# Patient Record
Sex: FEMALE | Race: WHITE | NOT HISPANIC OR LATINO | Employment: UNEMPLOYED | URBAN - METROPOLITAN AREA
[De-identification: names, ages, dates, MRNs, and addresses within clinical notes are randomized per-mention and may not be internally consistent; named-entity substitution may affect disease eponyms.]

---

## 2024-02-02 LAB
EXTERNAL ABO GROUPING: NORMAL
EXTERNAL RH FACTOR: POSITIVE
EXTERNAL RUBELLA QUALITATIVE: NORMAL

## 2024-03-05 ENCOUNTER — INITIAL PRENATAL (OUTPATIENT)
Dept: OBSTETRICS AND GYNECOLOGY | Age: 34
End: 2024-03-05
Payer: COMMERCIAL

## 2024-03-05 VITALS — DIASTOLIC BLOOD PRESSURE: 74 MMHG | WEIGHT: 162 LBS | SYSTOLIC BLOOD PRESSURE: 122 MMHG

## 2024-03-05 DIAGNOSIS — D58.2 HIGH BLOOD HEMOGLOBIN F: ICD-10-CM

## 2024-03-05 DIAGNOSIS — Z98.891 HISTORY OF C-SECTION: ICD-10-CM

## 2024-03-05 DIAGNOSIS — Z34.01 ENCOUNTER FOR SUPERVISION OF NORMAL FIRST PREGNANCY IN FIRST TRIMESTER: Primary | ICD-10-CM

## 2024-03-05 DIAGNOSIS — O09.899 HISTORY OF PRETERM DELIVERY, CURRENTLY PREGNANT: ICD-10-CM

## 2024-03-05 RX ORDER — PRENATAL VIT NO.126/IRON/FOLIC 28MG-0.8MG
1 TABLET ORAL DAILY
COMMUNITY

## 2024-03-05 RX ORDER — FAMOTIDINE 20 MG/1
20 TABLET, FILM COATED ORAL
COMMUNITY

## 2024-03-05 RX ORDER — LANOLIN ALCOHOL/MO/W.PET/CERES
50 CREAM (GRAM) TOPICAL
COMMUNITY

## 2024-03-05 NOTE — PROGRESS NOTES
Chief Complaint   Patient presents with    Initial Prenatal Visit     NOb:transfer form [facility],EDC 24, Last ob u/s 24     HPI- Pt is 34 y.o.  at 13w6d here for prenatal visit.     ROS-     - No vaginal bleeding    GI- No abdominal pain    /74   Wt 73.5 kg (162 lb)   Exam - See flow sheet    Fetal heart rate is normal    Assessment-  Diagnoses and all orders for this visit:    Encounter for supervision of normal first pregnancy in first trimester  -     Urine Culture - Urine, Urine, Clean Catch    High blood hemoglobin F    History of     History of  delivery, currently pregnant    Other orders  -     famotidine (PEPCID) 20 MG tablet; Take 1 tablet by mouth.  -     doxylamine (UNISOM) 25 MG tablet; Take 1 tablet by mouth.  -     vitamin B-6 (PYRIDOXINE) 50 MG tablet; Take 1 tablet by mouth.  -     prenatal vitamin (prenatal, CLASSIC, vitamin) tablet; Take 1 tablet by mouth Daily.    Patient is new to our practice,  Previous records reviewed, lab work okay except for minimally elevated hemoglobin F, reflex testing was done and we have sent for those results  Patient declined cell free DNA  Hoping for repeat  at 39 weeks

## 2024-03-07 LAB
BACTERIA UR CULT: NORMAL
BACTERIA UR CULT: NORMAL

## 2024-04-02 ENCOUNTER — ROUTINE PRENATAL (OUTPATIENT)
Dept: OBSTETRICS AND GYNECOLOGY | Age: 34
End: 2024-04-02
Payer: COMMERCIAL

## 2024-04-02 VITALS — DIASTOLIC BLOOD PRESSURE: 70 MMHG | WEIGHT: 162 LBS | SYSTOLIC BLOOD PRESSURE: 120 MMHG

## 2024-04-02 DIAGNOSIS — Z34.82 PRENATAL CARE, SUBSEQUENT PREGNANCY, SECOND TRIMESTER: Primary | ICD-10-CM

## 2024-04-02 PROBLEM — O99.019 MATERNAL ANEMIA IN PREGNANCY, ANTEPARTUM: Status: ACTIVE | Noted: 2024-04-02

## 2024-04-02 LAB
CLARITY, POC: CLEAR
COLOR UR: YELLOW
GLUCOSE UR STRIP-MCNC: NEGATIVE MG/DL
PROT UR STRIP-MCNC: NEGATIVE MG/DL

## 2024-04-02 PROCEDURE — 0502F SUBSEQUENT PRENATAL CARE: CPT | Performed by: OBSTETRICS & GYNECOLOGY

## 2024-04-02 RX ORDER — FERROUS SULFATE 325(65) MG
325 TABLET ORAL
Qty: 30 TABLET | Refills: 11 | Status: SHIPPED | OUTPATIENT
Start: 2024-04-02

## 2024-04-02 NOTE — PROGRESS NOTES
Chief Complaint   Patient presents with    Routine Prenatal Visit     HPI- Pt is 34 y.o.  at 17w6d here for prenatal visit.     ROS-     - No vaginal bleeding    GI- No abdominal pain    /70   Wt 73.5 kg (162 lb)   Exam - See flow sheet    Fetal heart rate is normal    Assessment-  Diagnoses and all orders for this visit:    Prenatal care, subsequent pregnancy, second trimester  -     POC Urinalysis Dipstick    Other orders  -     ferrous sulfate 325 (65 FE) MG tablet; Take 1 tablet by mouth Daily With Breakfast.    Patient started to feel little bit better less nausea  Anatomy scan in 3 to 4 weeks  Discussed her anemia and multivitamin iron supplementation

## 2024-04-23 ENCOUNTER — ROUTINE PRENATAL (OUTPATIENT)
Dept: OBSTETRICS AND GYNECOLOGY | Age: 34
End: 2024-04-23
Payer: COMMERCIAL

## 2024-04-23 VITALS — DIASTOLIC BLOOD PRESSURE: 72 MMHG | WEIGHT: 165 LBS | SYSTOLIC BLOOD PRESSURE: 118 MMHG

## 2024-04-23 DIAGNOSIS — Z34.82 PRENATAL CARE, SUBSEQUENT PREGNANCY, SECOND TRIMESTER: Primary | ICD-10-CM

## 2024-04-23 PROCEDURE — 0502F SUBSEQUENT PRENATAL CARE: CPT | Performed by: OBSTETRICS & GYNECOLOGY

## 2024-04-23 NOTE — PROGRESS NOTES
Chief Complaint   Patient presents with    Pregnancy Ultrasound     Anatomy scan     HPI- Pt is 34 y.o.  at 20w6d here for prenatal visit.     ROS-     - No vaginal bleeding    GI- No abdominal pain    /72   Wt 74.8 kg (165 lb)   Exam - See flow sheet    Fetal heart rate is normal    Assessment-  Diagnoses and all orders for this visit:    Prenatal care, subsequent pregnancy, second trimester  -     POC Urinalysis Dipstick    Reviewed ultrasound findings  Male infant vertex good cervical length we will recheck anatomy scan at 30 weeks for incomplete cardiac otherwise reassuring.  1 hour glucose test in 8 weeks

## 2024-05-22 ENCOUNTER — ROUTINE PRENATAL (OUTPATIENT)
Dept: OBSTETRICS AND GYNECOLOGY | Age: 34
End: 2024-05-22
Payer: COMMERCIAL

## 2024-05-22 VITALS — DIASTOLIC BLOOD PRESSURE: 68 MMHG | WEIGHT: 170 LBS | SYSTOLIC BLOOD PRESSURE: 114 MMHG

## 2024-05-22 DIAGNOSIS — Z34.82 PRENATAL CARE, SUBSEQUENT PREGNANCY, SECOND TRIMESTER: Primary | ICD-10-CM

## 2024-05-22 NOTE — PROGRESS NOTES
Chief Complaint   Patient presents with    Routine Prenatal Visit     HPI- Pt is 34 y.o.  at 25w0d here for prenatal visit.     ROS-     - No vaginal bleeding    GI- No abdominal pain    /68   Wt 77.1 kg (170 lb)   Exam - See flow sheet    Fetal heart rate is normal    Assessment-  Diagnoses and all orders for this visit:    Prenatal care, subsequent pregnancy, second trimester  -     POC Urinalysis Dipstick      Overall doing well, reassuring physical exam, glucose hemoglobin repeat ultrasound next visit

## 2024-06-19 ENCOUNTER — ROUTINE PRENATAL (OUTPATIENT)
Dept: OBSTETRICS AND GYNECOLOGY | Age: 34
End: 2024-06-19
Payer: COMMERCIAL

## 2024-06-19 ENCOUNTER — PREP FOR SURGERY (OUTPATIENT)
Dept: OTHER | Facility: HOSPITAL | Age: 34
End: 2024-06-19
Payer: COMMERCIAL

## 2024-06-19 VITALS — SYSTOLIC BLOOD PRESSURE: 120 MMHG | WEIGHT: 175 LBS | DIASTOLIC BLOOD PRESSURE: 64 MMHG

## 2024-06-19 DIAGNOSIS — Z98.891 HISTORY OF C-SECTION: Primary | ICD-10-CM

## 2024-06-19 DIAGNOSIS — Z34.83 PRENATAL CARE, SUBSEQUENT PREGNANCY, THIRD TRIMESTER: Primary | ICD-10-CM

## 2024-06-19 RX ORDER — OXYTOCIN/0.9 % SODIUM CHLORIDE 30/500 ML
250 PLASTIC BAG, INJECTION (ML) INTRAVENOUS CONTINUOUS
OUTPATIENT
Start: 2024-06-19 | End: 2024-06-19

## 2024-06-19 RX ORDER — SODIUM CHLORIDE, SODIUM LACTATE, POTASSIUM CHLORIDE, CALCIUM CHLORIDE 600; 310; 30; 20 MG/100ML; MG/100ML; MG/100ML; MG/100ML
125 INJECTION, SOLUTION INTRAVENOUS CONTINUOUS
OUTPATIENT
Start: 2024-06-19

## 2024-06-19 RX ORDER — ONDANSETRON 4 MG/1
4 TABLET, ORALLY DISINTEGRATING ORAL EVERY 6 HOURS PRN
OUTPATIENT
Start: 2024-06-19

## 2024-06-19 RX ORDER — SODIUM CHLORIDE 0.9 % (FLUSH) 0.9 %
10 SYRINGE (ML) INJECTION AS NEEDED
OUTPATIENT
Start: 2024-06-19

## 2024-06-19 RX ORDER — MORPHINE SULFATE 2 MG/ML
2 INJECTION, SOLUTION INTRAMUSCULAR; INTRAVENOUS
OUTPATIENT
Start: 2024-06-19

## 2024-06-19 RX ORDER — SODIUM CHLORIDE 0.9 % (FLUSH) 0.9 %
10 SYRINGE (ML) INJECTION EVERY 12 HOURS SCHEDULED
OUTPATIENT
Start: 2024-06-19

## 2024-06-19 RX ORDER — LIDOCAINE HYDROCHLORIDE 10 MG/ML
0.5 INJECTION, SOLUTION INFILTRATION; PERINEURAL ONCE AS NEEDED
OUTPATIENT
Start: 2024-06-19

## 2024-06-19 RX ORDER — ACETAMINOPHEN 500 MG
1000 TABLET ORAL ONCE
OUTPATIENT
Start: 2024-06-19 | End: 2024-06-19

## 2024-06-19 RX ORDER — KETOROLAC TROMETHAMINE 30 MG/ML
30 INJECTION, SOLUTION INTRAMUSCULAR; INTRAVENOUS ONCE
OUTPATIENT
Start: 2024-06-19 | End: 2024-06-19

## 2024-06-19 RX ORDER — OXYTOCIN/0.9 % SODIUM CHLORIDE 30/500 ML
999 PLASTIC BAG, INJECTION (ML) INTRAVENOUS ONCE
OUTPATIENT
Start: 2024-06-19

## 2024-06-19 RX ORDER — METHYLERGONOVINE MALEATE 0.2 MG/ML
200 INJECTION INTRAVENOUS AS NEEDED
OUTPATIENT
Start: 2024-06-19

## 2024-06-19 RX ORDER — SODIUM CHLORIDE 9 MG/ML
40 INJECTION, SOLUTION INTRAVENOUS AS NEEDED
OUTPATIENT
Start: 2024-06-19

## 2024-06-19 RX ORDER — ONDANSETRON 2 MG/ML
4 INJECTION INTRAMUSCULAR; INTRAVENOUS EVERY 6 HOURS PRN
OUTPATIENT
Start: 2024-06-19

## 2024-06-19 RX ORDER — CARBOPROST TROMETHAMINE 250 UG/ML
250 INJECTION, SOLUTION INTRAMUSCULAR AS NEEDED
OUTPATIENT
Start: 2024-06-19

## 2024-06-19 NOTE — PROGRESS NOTES
Chief Complaint   Patient presents with    Pregnancy Ultrasound     1 hr gtt     HPI- Pt is 34 y.o.  at 29w0d here for prenatal visit.     ROS-     - No vaginal bleeding    GI- No abdominal pain    /64   Wt 79.4 kg (175 lb)   Exam - See flow sheet    Fetal heart rate is normal    Assessment-  Diagnoses and all orders for this visit:    Prenatal care, subsequent pregnancy, third trimester  -     Gestational Screen 1 Hr ([facility])  -     Hemoglobin  -     POC Urinalysis Dipstick    Ultrasound reassuring 3 pounds 1 ounces 89th percentile Vertex good fluid good placenta anatomy appears normal  Check a bile acid as patient has a history of gestational pruritus  Glucose hemoglobin RPR  Tdap  Schedule repeat

## 2024-06-20 LAB
BILE AC SERPL-SCNC: 1.5 UMOL/L (ref 0–10)
GLUCOSE 1H P 50 G GLC PO SERPL-MCNC: 84 MG/DL (ref 70–139)
HGB BLD-MCNC: 10 G/DL (ref 11.1–15.9)

## 2024-06-24 ENCOUNTER — TELEPHONE (OUTPATIENT)
Dept: OBSTETRICS AND GYNECOLOGY | Age: 34
End: 2024-06-24
Payer: COMMERCIAL

## 2024-06-24 NOTE — PROGRESS NOTES
Notify liver enzymes were normal, sugar was normal but she is mildly anemic would recommend taking 1 extra iron pill daily

## 2024-06-24 NOTE — TELEPHONE ENCOUNTER
----- Message from Pantera Link sent at 6/24/2024  9:37 AM EDT -----  Notify liver enzymes were normal, sugar was normal but she is mildly anemic would recommend taking 1 extra iron pill daily

## 2024-07-03 ENCOUNTER — ROUTINE PRENATAL (OUTPATIENT)
Dept: OBSTETRICS AND GYNECOLOGY | Age: 34
End: 2024-07-03
Payer: COMMERCIAL

## 2024-07-03 VITALS — SYSTOLIC BLOOD PRESSURE: 118 MMHG | DIASTOLIC BLOOD PRESSURE: 70 MMHG | WEIGHT: 178 LBS

## 2024-07-03 DIAGNOSIS — Z98.891 HISTORY OF C-SECTION: ICD-10-CM

## 2024-07-03 DIAGNOSIS — O09.899 HISTORY OF PRETERM DELIVERY, CURRENTLY PREGNANT: ICD-10-CM

## 2024-07-03 DIAGNOSIS — Z34.83 PRENATAL CARE, SUBSEQUENT PREGNANCY, THIRD TRIMESTER: ICD-10-CM

## 2024-07-03 DIAGNOSIS — O99.019 MATERNAL ANEMIA IN PREGNANCY, ANTEPARTUM: Primary | ICD-10-CM

## 2024-07-03 NOTE — PROGRESS NOTES
Chief Complaint   Patient presents with    Routine Prenatal Visit     HPI- Pt is 34 y.o.  at 31w0d here for prenatal visit.     ROS-     - No vaginal bleeding    GI- No abdominal pain    /70   Wt 80.7 kg (178 lb)   Exam - See flow sheet    Fetal heart rate is normal    Assessment-  Diagnoses and all orders for this visit:    Maternal anemia in pregnancy, antepartum  -     POC Urinalysis Dipstick    History of  delivery, currently pregnant  -     POC Urinalysis Dipstick    History of   -     POC Urinalysis Dipstick    Prenatal care, subsequent pregnancy, third trimester  -     POC Urinalysis Dipstick      Patient doing well, has anemia for which she is taking iron, glucose test was normal  Blood pressure urine looks good  Repeat  is scheduled for 828 around noon  Patient has no significant travel plans  Will call our office with any change in condition otherwise return the office in 2,4, 5 and 6 weeks

## 2024-07-16 ENCOUNTER — ROUTINE PRENATAL (OUTPATIENT)
Dept: OBSTETRICS AND GYNECOLOGY | Age: 34
End: 2024-07-16
Payer: COMMERCIAL

## 2024-07-16 VITALS — DIASTOLIC BLOOD PRESSURE: 74 MMHG | WEIGHT: 178 LBS | SYSTOLIC BLOOD PRESSURE: 112 MMHG

## 2024-07-16 DIAGNOSIS — O99.019 MATERNAL ANEMIA IN PREGNANCY, ANTEPARTUM: ICD-10-CM

## 2024-07-16 DIAGNOSIS — D58.2 HIGH BLOOD HEMOGLOBIN F: ICD-10-CM

## 2024-07-16 DIAGNOSIS — Z3A.32 32 WEEKS GESTATION OF PREGNANCY: Primary | ICD-10-CM

## 2024-07-16 DIAGNOSIS — O09.899 HISTORY OF PRETERM DELIVERY, CURRENTLY PREGNANT: ICD-10-CM

## 2024-07-16 LAB
GLUCOSE UR STRIP-MCNC: NEGATIVE MG/DL
PROT UR STRIP-MCNC: NEGATIVE MG/DL

## 2024-07-16 NOTE — PROGRESS NOTES
Chief Complaint   Patient presents with    Routine Prenatal Visit     32 weeks  CC:  no problems       HPI: 34 y.o.  at 32w6d     Doing well  Reports good FM  Denies LOF, bleeding or ctx's  Pt of Dr. Reynoso     Vitals:    24 0954   BP: 112/74   Weight: 80.7 kg (178 lb)       ROS:  GI:  Negative  : Negative  Pulmonary: Negative     A/P  1. Intrauterine pregnancy at 32w6d   2. Pregnancy Risk:  NORMAL    Diagnoses and all orders for this visit:    1. 32 weeks gestation of pregnancy (Primary)  -     POC Urinalysis Dipstick    2. High blood hemoglobin F    3. History of  delivery, currently pregnant    4. Maternal anemia in pregnancy, antepartum        -----------------------  PLAN:   Anemia - continue iron  PTL/FKC discussed  Return in 2 weeks (on 2024).      Robyn Hale, MICH  2024 10:08 EDT

## 2024-08-09 ENCOUNTER — ROUTINE PRENATAL (OUTPATIENT)
Dept: OBSTETRICS AND GYNECOLOGY | Age: 34
End: 2024-08-09
Payer: COMMERCIAL

## 2024-08-09 VITALS — DIASTOLIC BLOOD PRESSURE: 74 MMHG | WEIGHT: 186 LBS | SYSTOLIC BLOOD PRESSURE: 118 MMHG

## 2024-08-09 DIAGNOSIS — Z13.89 SCREENING FOR BLOOD OR PROTEIN IN URINE: ICD-10-CM

## 2024-08-09 DIAGNOSIS — Z3A.36 36 WEEKS GESTATION OF PREGNANCY: Primary | ICD-10-CM

## 2024-08-09 DIAGNOSIS — O99.019 MATERNAL ANEMIA IN PREGNANCY, ANTEPARTUM: ICD-10-CM

## 2024-08-09 DIAGNOSIS — O09.899 HISTORY OF PRETERM DELIVERY, CURRENTLY PREGNANT: ICD-10-CM

## 2024-08-09 DIAGNOSIS — Z98.891 HISTORY OF C-SECTION: ICD-10-CM

## 2024-08-09 LAB
GLUCOSE UR STRIP-MCNC: NEGATIVE MG/DL
PROT UR STRIP-MCNC: NEGATIVE MG/DL

## 2024-08-09 NOTE — PROGRESS NOTES
Chief Complaint   Patient presents with    Routine Prenatal Visit     Ob check- 36w2d pt feeling well       HPI: 34 y.o.  at 36w2d presents for OB follow-up.  Patient of Dr. Reynoso  She is doing well today.   GBS swab today, patient has penicillin allergy.    Relevant data reviewed:      Last OB US growth (since 3/22/2024)       None          Vitals:    24 1054   BP: 118/74   Weight: 84.4 kg (186 lb)     Total weight gain for pregnancy:  Not found.    Review of systems:   Gen: negative  CV:     negative  GI: negative  :   negative and good fetal movement noted   MS:    negative  Neuro: negative  Pul: negative    Physical Exam  Constitutional:       General: She is not in acute distress.  Cardiovascular:      Rate and Rhythm: Normal rate and regular rhythm.   Pulmonary:      Effort: Pulmonary effort is normal.      Breath sounds: Normal breath sounds.   Abdominal:      Palpations: Abdomen is soft.      Tenderness: There is no abdominal tenderness.   Genitourinary:     Exam position: Lithotomy position.      Labia:         Right: No rash, tenderness or lesion.         Left: No rash, tenderness or lesion.       Comments: GBS swab completed  Skin:     General: Skin is warm.   Neurological:      Mental Status: She is alert and oriented to person, place, and time.   Psychiatric:         Mood and Affect: Mood normal.         Behavior: Behavior normal.         Thought Content: Thought content normal.         Judgment: Judgment normal.         A/P  1. Intrauterine pregnancy at 36w2d   2. Pregnancy Risk:  NORMAL    Diagnoses and all orders for this visit:    1. 36 weeks gestation of pregnancy (Primary)  -     Strep Gp B Culture+Rfx ([facility]) - Swab, Vaginal/Rectum    2. Screening for blood or protein in urine  -     POC Urinalysis Dipstick    3. Maternal anemia in pregnancy, antepartum    4. History of  delivery, currently pregnant    5. History of         Pre-eclampsia symptoms were discussed and  warnings were given.   labor was discussed.  Warnings were provided.  -----------------------  PLAN:   GBS swab with reflex completed today - we will call patient with results  Anemia - continue iron  Repeat  scheduled for   Return in about 1 week (around 2024) for OB f/u with Dr. Reynoso.    Tammie Brown PA-C  2024 11:43 EDT

## 2024-08-13 ENCOUNTER — ROUTINE PRENATAL (OUTPATIENT)
Dept: OBSTETRICS AND GYNECOLOGY | Age: 34
End: 2024-08-13
Payer: COMMERCIAL

## 2024-08-13 VITALS — DIASTOLIC BLOOD PRESSURE: 70 MMHG | SYSTOLIC BLOOD PRESSURE: 124 MMHG | WEIGHT: 187 LBS

## 2024-08-13 DIAGNOSIS — Z34.83 PRENATAL CARE, SUBSEQUENT PREGNANCY, THIRD TRIMESTER: Primary | ICD-10-CM

## 2024-08-13 PROCEDURE — 0502F SUBSEQUENT PRENATAL CARE: CPT | Performed by: OBSTETRICS & GYNECOLOGY

## 2024-08-13 NOTE — PROGRESS NOTES
Chief Complaint   Patient presents with    Routine Prenatal Visit     HPI- Pt is 34 y.o.  at 36w6d here for prenatal visit.     ROS-     - No vaginal bleeding    GI- No abdominal pain    /70   Wt 84.8 kg (187 lb)   Exam - See flow sheet    Fetal heart rate is normal    Assessment-  Diagnoses and all orders for this visit:    Prenatal care, subsequent pregnancy, third trimester  -     POC Urinalysis Dipstick    Cervix long thick and closed vertex  GBS pending  Reassuring physical exam  Call with any change

## 2024-08-15 LAB
B-HEM STREP SPEC QL CULT: POSITIVE
CLINDAMYCIN ISLT KB: ABNORMAL
ORGANISM ID: ABNORMAL

## 2024-08-20 ENCOUNTER — ROUTINE PRENATAL (OUTPATIENT)
Dept: OBSTETRICS AND GYNECOLOGY | Age: 34
End: 2024-08-20
Payer: COMMERCIAL

## 2024-08-20 VITALS — SYSTOLIC BLOOD PRESSURE: 118 MMHG | WEIGHT: 189 LBS | DIASTOLIC BLOOD PRESSURE: 70 MMHG

## 2024-08-20 DIAGNOSIS — O99.820 GBS (GROUP B STREPTOCOCCUS CARRIER), +RV CULTURE, CURRENTLY PREGNANT: ICD-10-CM

## 2024-08-20 DIAGNOSIS — O99.019 MATERNAL ANEMIA IN PREGNANCY, ANTEPARTUM: ICD-10-CM

## 2024-08-20 DIAGNOSIS — Z98.891 HISTORY OF C-SECTION: ICD-10-CM

## 2024-08-20 DIAGNOSIS — Z34.83 PRENATAL CARE, SUBSEQUENT PREGNANCY, THIRD TRIMESTER: Primary | ICD-10-CM

## 2024-08-20 PROCEDURE — 0502F SUBSEQUENT PRENATAL CARE: CPT | Performed by: OBSTETRICS & GYNECOLOGY

## 2024-08-20 NOTE — PROGRESS NOTES
Chief Complaint   Patient presents with    Routine Prenatal Visit     HPI- Pt is 34 y.o.  at 37w6d here for prenatal visit.     ROS-     - No vaginal bleeding    GI- No abdominal pain    /70   Wt 85.7 kg (189 lb)   Exam - See flow sheet    Fetal heart rate is normal    Assessment-  Diagnoses and all orders for this visit:    Prenatal care, subsequent pregnancy, third trimester  -     POC Urinalysis Dipstick    GBS (group B Streptococcus carrier), +RV culture, currently pregnant    History of     Maternal anemia in pregnancy, antepartum    Discussed positive GBS  Cervix long thick and closed  Vertex presentation -3 not engaged  Viewed signs symptoms of labor and upcoming repeat

## 2024-08-28 ENCOUNTER — ANESTHESIA (OUTPATIENT)
Dept: LABOR AND DELIVERY | Facility: HOSPITAL | Age: 34
End: 2024-08-28
Payer: COMMERCIAL

## 2024-08-28 ENCOUNTER — HOSPITAL ENCOUNTER (INPATIENT)
Facility: HOSPITAL | Age: 34
LOS: 3 days | Discharge: HOME | End: 2024-08-31
Attending: OBSTETRICS & GYNECOLOGY | Admitting: OBSTETRICS & GYNECOLOGY
Payer: COMMERCIAL

## 2024-08-28 ENCOUNTER — ANESTHESIA EVENT (OUTPATIENT)
Dept: LABOR AND DELIVERY | Facility: HOSPITAL | Age: 34
End: 2024-08-28
Payer: COMMERCIAL

## 2024-08-28 DIAGNOSIS — Z98.891 S/P CESAREAN SECTION: Primary | ICD-10-CM

## 2024-08-28 DIAGNOSIS — Z98.891 HISTORY OF C-SECTION: ICD-10-CM

## 2024-08-28 LAB
ABO GROUP BLD: NORMAL
BASOPHILS # BLD AUTO: 0.03 10*3/MM3 (ref 0–0.2)
BASOPHILS NFR BLD AUTO: 0.2 % (ref 0–1.5)
BLD GP AB SCN SERPL QL: NEGATIVE
DEPRECATED RDW RBC AUTO: 42.5 FL (ref 37–54)
EOSINOPHIL # BLD AUTO: 0.04 10*3/MM3 (ref 0–0.4)
EOSINOPHIL NFR BLD AUTO: 0.3 % (ref 0.3–6.2)
ERYTHROCYTE [DISTWIDTH] IN BLOOD BY AUTOMATED COUNT: 13.7 % (ref 12.3–15.4)
HCT VFR BLD AUTO: 35.2 % (ref 34–46.6)
HGB BLD-MCNC: 11.1 G/DL (ref 12–15.9)
IMM GRANULOCYTES # BLD AUTO: 0.15 10*3/MM3 (ref 0–0.05)
IMM GRANULOCYTES NFR BLD AUTO: 1.1 % (ref 0–0.5)
LYMPHOCYTES # BLD AUTO: 1.55 10*3/MM3 (ref 0.7–3.1)
LYMPHOCYTES NFR BLD AUTO: 10.9 % (ref 19.6–45.3)
MCH RBC QN AUTO: 26.7 PG (ref 26.6–33)
MCHC RBC AUTO-ENTMCNC: 31.5 G/DL (ref 31.5–35.7)
MCV RBC AUTO: 84.8 FL (ref 79–97)
MONOCYTES # BLD AUTO: 0.9 10*3/MM3 (ref 0.1–0.9)
MONOCYTES NFR BLD AUTO: 6.3 % (ref 5–12)
NEUTROPHILS NFR BLD AUTO: 11.51 10*3/MM3 (ref 1.7–7)
NEUTROPHILS NFR BLD AUTO: 81.2 % (ref 42.7–76)
NRBC BLD AUTO-RTO: 0 /100 WBC (ref 0–0.2)
PLATELET # BLD AUTO: 171 10*3/MM3 (ref 140–450)
PMV BLD AUTO: 12.2 FL (ref 6–12)
RBC # BLD AUTO: 4.15 10*6/MM3 (ref 3.77–5.28)
RH BLD: POSITIVE
T&S EXPIRATION DATE: NORMAL
TREPONEMA PALLIDUM IGG+IGM AB [PRESENCE] IN SERUM OR PLASMA BY IMMUNOASSAY: NORMAL
WBC NRBC COR # BLD AUTO: 14.18 10*3/MM3 (ref 3.4–10.8)

## 2024-08-28 PROCEDURE — S0260 H&P FOR SURGERY: HCPCS | Performed by: OBSTETRICS & GYNECOLOGY

## 2024-08-28 PROCEDURE — 25010000002 FENTANYL CITRATE (PF) 50 MCG/ML SOLUTION: Performed by: ANESTHESIOLOGY

## 2024-08-28 PROCEDURE — 25810000003 LACTATED RINGERS PER 1000 ML: Performed by: OBSTETRICS & GYNECOLOGY

## 2024-08-28 PROCEDURE — 86850 RBC ANTIBODY SCREEN: CPT | Performed by: OBSTETRICS & GYNECOLOGY

## 2024-08-28 PROCEDURE — 25010000002 ONDANSETRON PER 1 MG: Performed by: OBSTETRICS & GYNECOLOGY

## 2024-08-28 PROCEDURE — 63710000001 PROMETHAZINE PER 12.5 MG: Performed by: OBSTETRICS & GYNECOLOGY

## 2024-08-28 PROCEDURE — 86780 TREPONEMA PALLIDUM: CPT | Performed by: OBSTETRICS & GYNECOLOGY

## 2024-08-28 PROCEDURE — 25810000003 LACTATED RINGERS SOLUTION: Performed by: OBSTETRICS & GYNECOLOGY

## 2024-08-28 PROCEDURE — 25010000002 MORPHINE PER 10 MG: Performed by: ANESTHESIOLOGY

## 2024-08-28 PROCEDURE — 25010000002 KETOROLAC TROMETHAMINE PER 15 MG: Performed by: OBSTETRICS & GYNECOLOGY

## 2024-08-28 PROCEDURE — 25010000002 BUPIVACAINE PF 0.75 % SOLUTION: Performed by: ANESTHESIOLOGY

## 2024-08-28 PROCEDURE — 59510 CESAREAN DELIVERY: CPT | Performed by: OBSTETRICS & GYNECOLOGY

## 2024-08-28 PROCEDURE — 85025 COMPLETE CBC W/AUTO DIFF WBC: CPT | Performed by: OBSTETRICS & GYNECOLOGY

## 2024-08-28 PROCEDURE — 86900 BLOOD TYPING SEROLOGIC ABO: CPT | Performed by: OBSTETRICS & GYNECOLOGY

## 2024-08-28 PROCEDURE — 25010000002 CEFAZOLIN PER 500 MG: Performed by: OBSTETRICS & GYNECOLOGY

## 2024-08-28 PROCEDURE — 86901 BLOOD TYPING SEROLOGIC RH(D): CPT | Performed by: OBSTETRICS & GYNECOLOGY

## 2024-08-28 PROCEDURE — 25010000002 PHENYLEPHRINE 10 MG/ML SOLUTION: Performed by: NURSE ANESTHETIST, CERTIFIED REGISTERED

## 2024-08-28 RX ORDER — SODIUM CHLORIDE, SODIUM LACTATE, POTASSIUM CHLORIDE, CALCIUM CHLORIDE 600; 310; 30; 20 MG/100ML; MG/100ML; MG/100ML; MG/100ML
9 INJECTION, SOLUTION INTRAVENOUS CONTINUOUS
Status: CANCELLED | OUTPATIENT
Start: 2024-08-28

## 2024-08-28 RX ORDER — ONDANSETRON 4 MG/1
4 TABLET, ORALLY DISINTEGRATING ORAL EVERY 6 HOURS PRN
Status: DISCONTINUED | OUTPATIENT
Start: 2024-08-28 | End: 2024-08-28

## 2024-08-28 RX ORDER — SODIUM CHLORIDE, SODIUM LACTATE, POTASSIUM CHLORIDE, CALCIUM CHLORIDE 600; 310; 30; 20 MG/100ML; MG/100ML; MG/100ML; MG/100ML
125 INJECTION, SOLUTION INTRAVENOUS CONTINUOUS
Status: DISCONTINUED | OUTPATIENT
Start: 2024-08-28 | End: 2024-08-28

## 2024-08-28 RX ORDER — METHYLERGONOVINE MALEATE 0.2 MG/ML
200 INJECTION INTRAVENOUS AS NEEDED
Status: DISCONTINUED | OUTPATIENT
Start: 2024-08-28 | End: 2024-08-28

## 2024-08-28 RX ORDER — ACETAMINOPHEN 500 MG
1000 TABLET ORAL ONCE
Status: COMPLETED | OUTPATIENT
Start: 2024-08-28 | End: 2024-08-28

## 2024-08-28 RX ORDER — OXYTOCIN/0.9 % SODIUM CHLORIDE 30/500 ML
125 PLASTIC BAG, INJECTION (ML) INTRAVENOUS ONCE AS NEEDED
Status: COMPLETED | OUTPATIENT
Start: 2024-08-28 | End: 2024-08-28

## 2024-08-28 RX ORDER — MORPHINE SULFATE 2 MG/ML
2 INJECTION, SOLUTION INTRAMUSCULAR; INTRAVENOUS
Status: DISCONTINUED | OUTPATIENT
Start: 2024-08-28 | End: 2024-08-28

## 2024-08-28 RX ORDER — DEXTROSE MONOHYDRATE 25 G/50ML
12.5 INJECTION, SOLUTION INTRAVENOUS AS NEEDED
Status: DISCONTINUED | OUTPATIENT
Start: 2024-08-28 | End: 2024-08-28

## 2024-08-28 RX ORDER — ONDANSETRON 2 MG/ML
4 INJECTION INTRAMUSCULAR; INTRAVENOUS EVERY 6 HOURS PRN
Status: DISCONTINUED | OUTPATIENT
Start: 2024-08-28 | End: 2024-08-31 | Stop reason: HOSPADM

## 2024-08-28 RX ORDER — HYDROMORPHONE HYDROCHLORIDE 1 MG/ML
0.5 INJECTION, SOLUTION INTRAMUSCULAR; INTRAVENOUS; SUBCUTANEOUS
Status: DISCONTINUED | OUTPATIENT
Start: 2024-08-28 | End: 2024-08-28 | Stop reason: HOSPADM

## 2024-08-28 RX ORDER — DIPHENHYDRAMINE HYDROCHLORIDE 50 MG/ML
25 INJECTION INTRAMUSCULAR; INTRAVENOUS EVERY 4 HOURS PRN
Status: DISCONTINUED | OUTPATIENT
Start: 2024-08-28 | End: 2024-08-31 | Stop reason: HOSPADM

## 2024-08-28 RX ORDER — KETOROLAC TROMETHAMINE 15 MG/ML
15 INJECTION, SOLUTION INTRAMUSCULAR; INTRAVENOUS EVERY 6 HOURS
Status: COMPLETED | OUTPATIENT
Start: 2024-08-28 | End: 2024-08-29

## 2024-08-28 RX ORDER — FENTANYL CITRATE 50 UG/ML
50 INJECTION, SOLUTION INTRAMUSCULAR; INTRAVENOUS ONCE AS NEEDED
Status: CANCELLED | OUTPATIENT
Start: 2024-08-28

## 2024-08-28 RX ORDER — OXYCODONE HYDROCHLORIDE 5 MG/1
5 TABLET ORAL EVERY 4 HOURS PRN
Status: DISCONTINUED | OUTPATIENT
Start: 2024-08-28 | End: 2024-08-31 | Stop reason: HOSPADM

## 2024-08-28 RX ORDER — SODIUM CHLORIDE 9 MG/ML
40 INJECTION, SOLUTION INTRAVENOUS AS NEEDED
Status: DISCONTINUED | OUTPATIENT
Start: 2024-08-28 | End: 2024-08-28

## 2024-08-28 RX ORDER — FENTANYL CITRATE 50 UG/ML
INJECTION, SOLUTION INTRAMUSCULAR; INTRAVENOUS
Status: COMPLETED | OUTPATIENT
Start: 2024-08-28 | End: 2024-08-28

## 2024-08-28 RX ORDER — MORPHINE SULFATE 2 MG/ML
2 INJECTION, SOLUTION INTRAMUSCULAR; INTRAVENOUS
Status: ACTIVE | OUTPATIENT
Start: 2024-08-28 | End: 2024-08-28

## 2024-08-28 RX ORDER — ONDANSETRON 2 MG/ML
4 INJECTION INTRAMUSCULAR; INTRAVENOUS ONCE AS NEEDED
Status: DISCONTINUED | OUTPATIENT
Start: 2024-08-28 | End: 2024-08-31 | Stop reason: HOSPADM

## 2024-08-28 RX ORDER — OXYCODONE HYDROCHLORIDE 10 MG/1
10 TABLET ORAL EVERY 4 HOURS PRN
Status: DISCONTINUED | OUTPATIENT
Start: 2024-08-28 | End: 2024-08-31 | Stop reason: HOSPADM

## 2024-08-28 RX ORDER — DROPERIDOL 2.5 MG/ML
0.62 INJECTION, SOLUTION INTRAMUSCULAR; INTRAVENOUS
Status: DISCONTINUED | OUTPATIENT
Start: 2024-08-28 | End: 2024-08-31 | Stop reason: HOSPADM

## 2024-08-28 RX ORDER — SODIUM CHLORIDE 0.9 % (FLUSH) 0.9 %
10 SYRINGE (ML) INJECTION EVERY 12 HOURS SCHEDULED
Status: DISCONTINUED | OUTPATIENT
Start: 2024-08-28 | End: 2024-08-28

## 2024-08-28 RX ORDER — LIDOCAINE HYDROCHLORIDE 10 MG/ML
0.5 INJECTION, SOLUTION INFILTRATION; PERINEURAL ONCE AS NEEDED
Status: DISCONTINUED | OUTPATIENT
Start: 2024-08-28 | End: 2024-08-28

## 2024-08-28 RX ORDER — IBUPROFEN 800 MG/1
800 TABLET, FILM COATED ORAL EVERY 8 HOURS SCHEDULED
Status: DISCONTINUED | OUTPATIENT
Start: 2024-08-29 | End: 2024-08-31 | Stop reason: HOSPADM

## 2024-08-28 RX ORDER — HYDROXYZINE HYDROCHLORIDE 50 MG/1
50 TABLET, FILM COATED ORAL EVERY 6 HOURS PRN
Status: DISCONTINUED | OUTPATIENT
Start: 2024-08-28 | End: 2024-08-31 | Stop reason: HOSPADM

## 2024-08-28 RX ORDER — PHENYLEPHRINE HYDROCHLORIDE 10 MG/ML
INJECTION INTRAVENOUS AS NEEDED
Status: DISCONTINUED | OUTPATIENT
Start: 2024-08-28 | End: 2024-08-28 | Stop reason: SURG

## 2024-08-28 RX ORDER — METOCLOPRAMIDE 10 MG/1
10 TABLET ORAL ONCE
Status: DISCONTINUED | OUTPATIENT
Start: 2024-08-28 | End: 2024-08-31 | Stop reason: HOSPADM

## 2024-08-28 RX ORDER — SIMETHICONE 80 MG
80 TABLET,CHEWABLE ORAL 4 TIMES DAILY PRN
Status: DISCONTINUED | OUTPATIENT
Start: 2024-08-28 | End: 2024-08-31 | Stop reason: HOSPADM

## 2024-08-28 RX ORDER — CALCIUM CARBONATE 500 MG/1
1 TABLET, CHEWABLE ORAL DAILY
COMMUNITY

## 2024-08-28 RX ORDER — OXYTOCIN/0.9 % SODIUM CHLORIDE 30/500 ML
250 PLASTIC BAG, INJECTION (ML) INTRAVENOUS CONTINUOUS
Status: DISPENSED | OUTPATIENT
Start: 2024-08-28 | End: 2024-08-28

## 2024-08-28 RX ORDER — SODIUM CHLORIDE 0.9 % (FLUSH) 0.9 %
10 SYRINGE (ML) INJECTION AS NEEDED
Status: DISCONTINUED | OUTPATIENT
Start: 2024-08-28 | End: 2024-08-28

## 2024-08-28 RX ORDER — DIPHENHYDRAMINE HCL 25 MG
25 CAPSULE ORAL EVERY 4 HOURS PRN
Status: DISCONTINUED | OUTPATIENT
Start: 2024-08-28 | End: 2024-08-31 | Stop reason: HOSPADM

## 2024-08-28 RX ORDER — BUPIVACAINE HYDROCHLORIDE 7.5 MG/ML
INJECTION, SOLUTION EPIDURAL; RETROBULBAR
Status: COMPLETED | OUTPATIENT
Start: 2024-08-28 | End: 2024-08-28

## 2024-08-28 RX ORDER — MIDAZOLAM HYDROCHLORIDE 1 MG/ML
1 INJECTION INTRAMUSCULAR; INTRAVENOUS
Status: CANCELLED | OUTPATIENT
Start: 2024-08-28

## 2024-08-28 RX ORDER — ONDANSETRON 4 MG/1
4 TABLET, ORALLY DISINTEGRATING ORAL EVERY 8 HOURS PRN
Status: DISCONTINUED | OUTPATIENT
Start: 2024-08-28 | End: 2024-08-31 | Stop reason: HOSPADM

## 2024-08-28 RX ORDER — SODIUM CHLORIDE 0.9 % (FLUSH) 0.9 %
3 SYRINGE (ML) INJECTION EVERY 12 HOURS SCHEDULED
Status: CANCELLED | OUTPATIENT
Start: 2024-08-28

## 2024-08-28 RX ORDER — PROMETHAZINE HYDROCHLORIDE 12.5 MG/1
12.5 TABLET ORAL EVERY 4 HOURS PRN
Status: DISCONTINUED | OUTPATIENT
Start: 2024-08-28 | End: 2024-08-31 | Stop reason: HOSPADM

## 2024-08-28 RX ORDER — ACETAMINOPHEN 500 MG
1000 TABLET ORAL EVERY 6 HOURS
Status: COMPLETED | OUTPATIENT
Start: 2024-08-28 | End: 2024-08-29

## 2024-08-28 RX ORDER — ONDANSETRON 2 MG/ML
4 INJECTION INTRAMUSCULAR; INTRAVENOUS EVERY 6 HOURS PRN
Status: DISCONTINUED | OUTPATIENT
Start: 2024-08-28 | End: 2024-08-28

## 2024-08-28 RX ORDER — SODIUM CHLORIDE 0.9 % (FLUSH) 0.9 %
3-10 SYRINGE (ML) INJECTION AS NEEDED
Status: CANCELLED | OUTPATIENT
Start: 2024-08-28

## 2024-08-28 RX ORDER — KETOROLAC TROMETHAMINE 30 MG/ML
30 INJECTION, SOLUTION INTRAMUSCULAR; INTRAVENOUS ONCE
Status: COMPLETED | OUTPATIENT
Start: 2024-08-28 | End: 2024-08-28

## 2024-08-28 RX ORDER — MORPHINE SULFATE 4 MG/ML
INJECTION, SOLUTION INTRAMUSCULAR; INTRAVENOUS
Status: COMPLETED | OUTPATIENT
Start: 2024-08-28 | End: 2024-08-28

## 2024-08-28 RX ORDER — NALOXONE HCL 0.4 MG/ML
0.2 VIAL (ML) INJECTION
Status: CANCELLED | OUTPATIENT
Start: 2024-08-28

## 2024-08-28 RX ORDER — CARBOPROST TROMETHAMINE 250 UG/ML
250 INJECTION, SOLUTION INTRAMUSCULAR AS NEEDED
Status: DISCONTINUED | OUTPATIENT
Start: 2024-08-28 | End: 2024-08-28

## 2024-08-28 RX ORDER — ACETAMINOPHEN 325 MG/1
650 TABLET ORAL EVERY 6 HOURS
Status: DISCONTINUED | OUTPATIENT
Start: 2024-08-29 | End: 2024-08-31 | Stop reason: HOSPADM

## 2024-08-28 RX ORDER — OXYTOCIN/0.9 % SODIUM CHLORIDE 30/500 ML
999 PLASTIC BAG, INJECTION (ML) INTRAVENOUS ONCE
Status: COMPLETED | OUTPATIENT
Start: 2024-08-28 | End: 2024-08-28

## 2024-08-28 RX ORDER — LIDOCAINE HYDROCHLORIDE 10 MG/ML
0.5 INJECTION, SOLUTION INFILTRATION; PERINEURAL ONCE AS NEEDED
Status: CANCELLED | OUTPATIENT
Start: 2024-08-28

## 2024-08-28 RX ORDER — PROMETHAZINE HYDROCHLORIDE 25 MG/1
25 TABLET ORAL EVERY 6 HOURS PRN
Status: DISCONTINUED | OUTPATIENT
Start: 2024-08-28 | End: 2024-08-31 | Stop reason: HOSPADM

## 2024-08-28 RX ORDER — ENOXAPARIN SODIUM 100 MG/ML
40 INJECTION SUBCUTANEOUS NIGHTLY
Status: DISCONTINUED | OUTPATIENT
Start: 2024-08-29 | End: 2024-08-31 | Stop reason: HOSPADM

## 2024-08-28 RX ORDER — FAMOTIDINE 10 MG/ML
20 INJECTION, SOLUTION INTRAVENOUS ONCE
Status: COMPLETED | OUTPATIENT
Start: 2024-08-28 | End: 2024-08-28

## 2024-08-28 RX ORDER — HYDROCORTISONE 25 MG/G
CREAM TOPICAL 3 TIMES DAILY PRN
Status: DISCONTINUED | OUTPATIENT
Start: 2024-08-28 | End: 2024-08-31 | Stop reason: HOSPADM

## 2024-08-28 RX ORDER — PROMETHAZINE HYDROCHLORIDE 12.5 MG/1
12.5 SUPPOSITORY RECTAL EVERY 6 HOURS PRN
Status: DISCONTINUED | OUTPATIENT
Start: 2024-08-28 | End: 2024-08-31 | Stop reason: HOSPADM

## 2024-08-28 RX ADMIN — SODIUM CHLORIDE, POTASSIUM CHLORIDE, SODIUM LACTATE AND CALCIUM CHLORIDE 1000 ML: 600; 310; 30; 20 INJECTION, SOLUTION INTRAVENOUS at 11:30

## 2024-08-28 RX ADMIN — ACETAMINOPHEN 1000 MG: 500 TABLET ORAL at 12:07

## 2024-08-28 RX ADMIN — BUPIVACAINE HYDROCHLORIDE 1.4 ML: 7.5 INJECTION, SOLUTION EPIDURAL; RETROBULBAR at 12:51

## 2024-08-28 RX ADMIN — PHENYLEPHRINE HYDROCHLORIDE 100 MCG: 10 INJECTION INTRAVENOUS at 13:03

## 2024-08-28 RX ADMIN — KETOROLAC TROMETHAMINE 30 MG: 30 INJECTION, SOLUTION INTRAMUSCULAR at 14:34

## 2024-08-28 RX ADMIN — ACETAMINOPHEN 1000 MG: 500 TABLET ORAL at 19:33

## 2024-08-28 RX ADMIN — PHENYLEPHRINE HYDROCHLORIDE 100 MCG: 10 INJECTION INTRAVENOUS at 13:07

## 2024-08-28 RX ADMIN — MORPHINE SULFATE 200 MCG: 4 INJECTION, SOLUTION INTRAMUSCULAR; INTRAVENOUS at 12:51

## 2024-08-28 RX ADMIN — FAMOTIDINE 20 MG: 10 INJECTION INTRAVENOUS at 12:34

## 2024-08-28 RX ADMIN — PHENYLEPHRINE HYDROCHLORIDE 100 MCG: 10 INJECTION INTRAVENOUS at 13:38

## 2024-08-28 RX ADMIN — SODIUM CHLORIDE, POTASSIUM CHLORIDE, SODIUM LACTATE AND CALCIUM CHLORIDE 125 ML/HR: 600; 310; 30; 20 INJECTION, SOLUTION INTRAVENOUS at 12:13

## 2024-08-28 RX ADMIN — ONDANSETRON 4 MG: 2 INJECTION INTRAMUSCULAR; INTRAVENOUS at 12:31

## 2024-08-28 RX ADMIN — PHENYLEPHRINE HYDROCHLORIDE 100 MCG: 10 INJECTION INTRAVENOUS at 12:52

## 2024-08-28 RX ADMIN — PHENYLEPHRINE HYDROCHLORIDE 100 MCG: 10 INJECTION INTRAVENOUS at 13:16

## 2024-08-28 RX ADMIN — Medication 999 ML/HR: at 13:15

## 2024-08-28 RX ADMIN — FENTANYL CITRATE 10 MCG: 50 INJECTION, SOLUTION INTRAMUSCULAR; INTRAVENOUS at 12:51

## 2024-08-28 RX ADMIN — PROMETHAZINE HYDROCHLORIDE 12.5 MG: 12.5 TABLET ORAL at 18:29

## 2024-08-28 RX ADMIN — Medication 125 ML/HR: at 14:47

## 2024-08-28 RX ADMIN — PHENYLEPHRINE HYDROCHLORIDE 100 MCG: 10 INJECTION INTRAVENOUS at 12:58

## 2024-08-28 RX ADMIN — SODIUM CHLORIDE 2000 MG: 900 INJECTION INTRAVENOUS at 12:35

## 2024-08-28 RX ADMIN — KETOROLAC TROMETHAMINE 15 MG: 15 INJECTION, SOLUTION INTRAMUSCULAR; INTRAVENOUS at 22:14

## 2024-08-28 RX ADMIN — ONDANSETRON 4 MG: 2 INJECTION INTRAMUSCULAR; INTRAVENOUS at 14:55

## 2024-08-28 NOTE — H&P
[facility]   PREOPERATIVE HISTORY AND PHYSICAL    Patient Name:Miladis Downing  : 1990  MRN: [id number]  Primary Care Physician: Provider, No Known  Date of admission: (Not on file)    Subjective   Subjective     Chief Complaint: preoperative evaluation    History of Present Illness  Miladis Downing is a 34 y.o. female  who presents for preoperative evaluation. She is scheduled for  SECTION REPEAT (N/A)    Patient Active Problem List   Diagnosis    High blood hemoglobin F    History of     History of  delivery, currently pregnant    Maternal anemia in pregnancy, antepartum    GBS (group B Streptococcus carrier), +RV culture, currently pregnant       Review of Systems neg    Personal History     No past medical history on file.    No past surgical history on file.    Obstetric History:  OB History          5    Para   3    Term   2       1    AB   1    Living   3         SAB        IAB        Ectopic   1    Molar        Multiple        Live Births   3               Menstrual History:     No LMP recorded. Patient is pregnant.       # 1 - Date: , Sex: None, Weight: None, GA: None, Type: None, Apgar1: None, Apgar5: None, Living: None, Birth Comments: None    # 2 - Date: , Sex: None, Weight: 3685 g (8 lb 2 oz), GA: None, Type: , Low Transverse, Apgar1: None, Apgar5: None, Living: Living, Birth Comments: None    # 3 - Date: , Sex: None, Weight: 3062 g (6 lb 12 oz), GA: None, Type: , Low Transverse, Apgar1: None, Apgar5: None, Living: Living, Birth Comments: None    # 4 - Date: , Sex: None, Weight: 3685 g (8 lb 2 oz), GA: None, Type: , Low Transverse, Apgar1: None, Apgar5: None, Living: Living, Birth Comments: None    # 5 - Date: None, Sex: None, Weight: None, GA: None, Type: None, Apgar1: None, Apgar5: None, Living: None, Birth Comments: None      Family History: Her family history is not on file.     Social History: She  has no  history on file for tobacco use, alcohol use, and drug use.    Home Medications:  doxylamine, famotidine, ferrous sulfate, prenatal vitamin, and vitamin B-6    Allergies:  She is allergic to penicillins and penicillin g procaine.    Objective    Objective     Vitals:         Physical Exam  Alert and oriented, heart regular rate and rhythm  Lungs are clear  Abdomen is gravid at 38 and half centimeters  Vertex presentation  Extremities normal with trace edema DTRs are normal  Cervical exam deferred    Assessment & Plan   Assessment / Plan     Brief Patient Summary:  Miladis Downing is a 34 y.o. female who presents for preoperative evaluation.    Pre-Op Diagnosis Codes:     * History of  [Z98.891]    Active Hospital Problems:  Active Hospital Problems    Diagnosis     **History of       Plan:   Procedure(s):   SECTION REPEAT    The risks, benefits, and alternatives of the procedure are reviewed with the patient including but not limited to bleeding, infection and damage to internal organs.    Questions and concerns were addressed.     Pantera Reynoso MD

## 2024-08-28 NOTE — L&D DELIVERY NOTE
Repeat low-transverse  SECTION, AND SCAR REVISION FULL OPERATIVE REPORT  Pre-operative Diagnosis: Term intrauterine pregnancy desirous of repeat low-transverse  section  Post-operative Diagnosis: Same  Procedure: Repeat low-transverse  Section with excision of old scar  Anesthesia: Spinal  Surgeon(s): BRYCE Reynoso  Assistant(s):   Assistant: Tank Monge MD  was responsible for performing the following activities: Retraction, Suction, Irrigation, Suturing, and Delivery of Fetus and their skilled assistance was necessary for the success of this case.  Gestational age 39 weeks  Active Hospital Problems    Diagnosis  POA    **History of  [Z98.891]  Not Applicable    H/O:  [Z98.891]  Not Applicable    GBS (group B Streptococcus carrier), +RV culture, currently pregnant [O99.820]  Not Applicable    Maternal anemia in pregnancy, antepartum [O99.019]  Yes     Infant    Findings: male  infant     Weight: 3840 g (8 lb 7.5 oz)   Length: 21  in  Observations/Comments:  panda or 1      9  @ 1 minute /   9  @ 5 minutes          Complications: none  Specimens: None  EBL: See epic for quantitative blood loss      Description of Procedure:After reviewing risks,benefits, and possible complications, the patient was taken to the operating room where anesthesia was found to be adequate. She was prepped and draped in the usual sterile fashion in the dorsal supine position with a leftward tilt.   A Pfannenstiel skin incision was made with the scalpel and carried through the fat to the underlying layer of fascia.  Above and below the old incision, excising the previous scar which had thickened and keloid did   the fascia was then incised in the midline and the incision was extended laterally with Garcia scissors. The superior aspect of the fascia was then grasped with Kocher clamps and the underlying rectus muscles were then dissected off bluntly and  with the Garcia scissors. The inferior aspect of the  fascia was then grasped with Kocher clamps and dissected off similarly with Garcia scissors. The rectus muscles were  and the peritoneum  entered. The peritoneal incision was then carried superiorly and inferiorly taking care to identify the bladder at the lower aspect.   The bladder blade was inserted. The vesicouterine peritoneum was then identified and entered sharply with Metzenbaum scissors and a bladder flap was created.. The bladder blade was reinserted and the uterine incision was made down to the chorionic laeve. This was extended with the bandage scissors. Membranes were then ruptured with an Allis clamp.  The bladder blade was removed and the infant was delivered atraumatically. The infant was moving all four extremities.The nose and mouth were suctioned and the cord was clamped and cut. The infant was taken to the warmer for the   staff, who were present via hospital policy. Cord blood was collected. The placenta was removed, and the uterus was cleared of all clots and debris. The uterine incision was repaired in two layers using 0 chromic suture. The uterus was examined and noted to be hemostatic.  The posterior cul-de-sac and gutters were cleared of all clots and debris. The uterus,tubes and ovaries appeared normal. The uterine incision was  then re-inspected to ensure hemostasis as were all subfascial tissues. Vesic-uterine peritoneum was closed with a 3-0 vicryl suture. All counts were correct and parietal peritoneum was closed with a 3-0 vicryl suture.The fascia was re-approximated in a running fashion using 0-vicryl suture. Three interrupted 0 vicryl sutures were placed laterally and in the midline for reinforcement. The subcutaneous tissue was irrigated,  re-approximated in a running fashion with 3-0 vicryl. The skin was closed with 4-0 vicryl. Bandages were applied. Chang was draining clear urine.    The patient tolerated the procedure well. Sponge, lap and needle counts were  correct. The patient was taken to recovery in stable condition.    Pantera Reynoso MD    8/28/2024  13:48 EDT

## 2024-08-28 NOTE — ANESTHESIA PREPROCEDURE EVALUATION
Anesthesia Evaluation     Patient summary reviewed and Nursing notes reviewed   NPO Solid Status: > 8 hours  NPO Liquid Status: > 2 hours           Airway   Mallampati: I  TM distance: >3 FB  Neck ROM: full  No difficulty expected  Dental - normal exam     Pulmonary - negative pulmonary ROS   Cardiovascular - negative cardio ROS  Exercise tolerance: good (4-7 METS)        Neuro/Psych- negative ROS  GI/Hepatic/Renal/Endo - negative ROS     Musculoskeletal (-) negative ROS    Abdominal    Substance History - negative use     OB/GYN negative ob/gyn ROS   (+) Pregnant        Other                    Anesthesia Plan    ASA 3     spinal       Anesthetic plan, risks, benefits, and alternatives have been provided, discussed and informed consent has been obtained with: patient.    CODE STATUS:    Level Of Support Discussed With: Patient  Code Status (Patient has no pulse and is not breathing): CPR (Attempt to Resuscitate)  Medical Interventions (Patient has pulse or is breathing): Full Support

## 2024-08-28 NOTE — ANESTHESIA PROCEDURE NOTES
Spinal Block      Patient reassessed immediately prior to procedure    Patient location during procedure: OB  Start Time: 8/28/2024 12:49 PM  Stop Time: 8/28/2024 12:51 PM  Indication:at surgeon's request  Performed By  CRNA/CHANTALE: Katlin Epperson CRNA  Preanesthetic Checklist  Completed: patient identified, IV checked, site marked, risks and benefits discussed, surgical consent, monitors and equipment checked, pre-op evaluation and timeout performed  Spinal Block Prep:  Patient Position:sitting  Sterile Tech:gloves, cap, mask and sterile barriers  Prep:Chloraprep  Patient Monitoring:blood pressure monitoring, continuous pulse oximetry and EKG    Spinal Block Procedure  Approach:midline  Guidance:landmark technique and palpation technique  Location:L4-L5  Needle Type:Skip  Needle Gauge:25  Placement of Spinal needle event:cerebrospinal fluid aspirated  Paresthesia: no  Fluid Appearance:clear  Medications: fentaNYL citrate (PF) (SUBLIMAZE) injection - Intrathecal   10 mcg - 8/28/2024 12:51:00 PM  Morphine sulfate (PF) injection - Intrathecal   200 mcg - 8/28/2024 12:51:00 PM  bupivacaine PF (MARCAINE) 0.75 % injection - Intrathecal   1.4 mL - 8/28/2024 12:51:00 PM   Post Assessment  Patient Tolerance:patient tolerated the procedure well with no apparent complications  Complications no

## 2024-08-28 NOTE — PLAN OF CARE
Problem: Adult Inpatient Plan of Care  Goal: Plan of Care Review  Outcome: Ongoing, Progressing  Flowsheets (Taken 2024 1531)  Progress: improving  Plan of Care Reviewed With: patient  Outcome Evaluation: Status post-. Pain and bleeding controlled. Assisting with breastfeeding. Zofran given for mild PO N/V. ERAS protocol followed.  Goal: Patient-Specific Goal (Individualized)  Outcome: Ongoing, Progressing  Goal: Absence of Hospital-Acquired Illness or Injury  Outcome: Ongoing, Progressing  Goal: Optimal Comfort and Wellbeing  Outcome: Ongoing, Progressing  Goal: Readiness for Transition of Care  Outcome: Ongoing, Progressing     Problem: Device-Related Complication Risk (Anesthesia/Analgesia, Neuraxial)  Goal: Safe Infusion Delivery Completion  Outcome: Ongoing, Progressing     Problem: Infection (Anesthesia/Analgesia, Neuraxial)  Goal: Absence of Infection Signs and Symptoms  Outcome: Ongoing, Progressing     Problem: Nausea and Vomiting (Anesthesia/Analgesia, Neuraxial)  Goal: Nausea and Vomiting Relief  Outcome: Ongoing, Progressing     Problem: Pain (Anesthesia/Analgesia, Neuraxial)  Goal: Effective Pain Control  Outcome: Ongoing, Progressing     Problem: Respiratory Compromise (Anesthesia/Analgesia, Neuraxial)  Goal: Effective Oxygenation and Ventilation  Outcome: Ongoing, Progressing     Problem: Sensorimotor Impairment (Anesthesia/Analgesia, Neuraxial)  Goal: Baseline Motor Function  Outcome: Ongoing, Progressing     Problem: Urinary Retention (Anesthesia/Analgesia, Neuraxial)  Goal: Effective Urinary Elimination  Outcome: Ongoing, Progressing     Problem:  Fall Injury Risk  Goal: Absence of Fall, Infant Drop and Related Injury  Outcome: Ongoing, Progressing     Problem: Skin Injury Risk Increased  Goal: Skin Health and Integrity  Outcome: Ongoing, Progressing   Goal Outcome Evaluation:  Plan of Care Reviewed With: patient        Progress: improving  Outcome Evaluation: Status  post-. Pain and bleeding controlled. Assisting with breastfeeding. Zofran given for mild PO N/V. ERAS protocol followed.

## 2024-08-28 NOTE — ANESTHESIA POSTPROCEDURE EVALUATION
Patient: Miladis Downing    Procedure Summary       Date: 24 Room / [facility]    Anesthesia Start: 1238 Anesthesia Stop: 1403    Procedure:  SECTION REPEAT (Abdomen) Diagnosis:       History of       (History of  [Z98.891])    Surgeons: Pantera Reynoso MD Provider: Rubin Young MD    Anesthesia Type: spinal ASA Status: 3            Anesthesia Type: spinal    Vitals  Vitals Value Taken Time   /59 24 1650   Temp 36.7 °C (98.1 °F) 24 1650   Pulse 67 24 1650   Resp 18 24 1650   SpO2 97 % 24 1650           Post Anesthesia Care and Evaluation    Patient location during evaluation: bedside  Patient participation: complete - patient participated  Level of consciousness: awake  Pain management: adequate    Airway patency: patent  Anesthetic complications: No anesthetic complications  PONV Status: none  Cardiovascular status: acceptable  Respiratory status: acceptable  Hydration status: acceptable  Post Neuraxial Block status: Motor and sensory function returned to baseline

## 2024-08-28 NOTE — LACTATION NOTE
This note was copied from a baby's chart.  P4 Term.  Reports baby is latching well so far.  Just finished BF on left side.  Mom is not feeling well at this time.  Offered to help latch baby to other side but will call if needed.  BF other children for 1 year.  Has a personal pump at home.  Encouraged to call LC if needs assistance.  Number is on whiteboard.

## 2024-08-29 LAB
BASOPHILS # BLD AUTO: 0.03 10*3/MM3 (ref 0–0.2)
BASOPHILS NFR BLD AUTO: 0.3 % (ref 0–1.5)
DEPRECATED RDW RBC AUTO: 42 FL (ref 37–54)
EOSINOPHIL # BLD AUTO: 0.06 10*3/MM3 (ref 0–0.4)
EOSINOPHIL NFR BLD AUTO: 0.6 % (ref 0.3–6.2)
ERYTHROCYTE [DISTWIDTH] IN BLOOD BY AUTOMATED COUNT: 13.8 % (ref 12.3–15.4)
HCT VFR BLD AUTO: 27.1 % (ref 34–46.6)
HGB BLD-MCNC: 8.6 G/DL (ref 12–15.9)
IMM GRANULOCYTES # BLD AUTO: 0.09 10*3/MM3 (ref 0–0.05)
IMM GRANULOCYTES NFR BLD AUTO: 0.9 % (ref 0–0.5)
LYMPHOCYTES # BLD AUTO: 1.7 10*3/MM3 (ref 0.7–3.1)
LYMPHOCYTES NFR BLD AUTO: 16.9 % (ref 19.6–45.3)
MCH RBC QN AUTO: 26.8 PG (ref 26.6–33)
MCHC RBC AUTO-ENTMCNC: 31.7 G/DL (ref 31.5–35.7)
MCV RBC AUTO: 84.4 FL (ref 79–97)
MONOCYTES # BLD AUTO: 0.83 10*3/MM3 (ref 0.1–0.9)
MONOCYTES NFR BLD AUTO: 8.3 % (ref 5–12)
NEUTROPHILS NFR BLD AUTO: 7.32 10*3/MM3 (ref 1.7–7)
NEUTROPHILS NFR BLD AUTO: 73 % (ref 42.7–76)
NRBC BLD AUTO-RTO: 0 /100 WBC (ref 0–0.2)
PLATELET # BLD AUTO: 185 10*3/MM3 (ref 140–450)
PMV BLD AUTO: 11.5 FL (ref 6–12)
RBC # BLD AUTO: 3.21 10*6/MM3 (ref 3.77–5.28)
WBC NRBC COR # BLD AUTO: 10.03 10*3/MM3 (ref 3.4–10.8)

## 2024-08-29 PROCEDURE — 0503F POSTPARTUM CARE VISIT: CPT | Performed by: OBSTETRICS & GYNECOLOGY

## 2024-08-29 PROCEDURE — 25010000002 KETOROLAC TROMETHAMINE PER 15 MG: Performed by: OBSTETRICS & GYNECOLOGY

## 2024-08-29 PROCEDURE — 63710000001 PROMETHAZINE PER 12.5 MG: Performed by: OBSTETRICS & GYNECOLOGY

## 2024-08-29 PROCEDURE — 25010000002 ENOXAPARIN PER 10 MG: Performed by: OBSTETRICS & GYNECOLOGY

## 2024-08-29 PROCEDURE — 85025 COMPLETE CBC W/AUTO DIFF WBC: CPT | Performed by: OBSTETRICS & GYNECOLOGY

## 2024-08-29 RX ADMIN — KETOROLAC TROMETHAMINE 15 MG: 15 INJECTION, SOLUTION INTRAMUSCULAR; INTRAVENOUS at 17:06

## 2024-08-29 RX ADMIN — ACETAMINOPHEN 325MG 650 MG: 325 TABLET ORAL at 19:59

## 2024-08-29 RX ADMIN — ACETAMINOPHEN 1000 MG: 500 TABLET ORAL at 07:40

## 2024-08-29 RX ADMIN — KETOROLAC TROMETHAMINE 15 MG: 15 INJECTION, SOLUTION INTRAMUSCULAR; INTRAVENOUS at 10:25

## 2024-08-29 RX ADMIN — ACETAMINOPHEN 1000 MG: 500 TABLET ORAL at 13:21

## 2024-08-29 RX ADMIN — OXYCODONE HYDROCHLORIDE 5 MG: 5 TABLET ORAL at 14:26

## 2024-08-29 RX ADMIN — OXYCODONE HYDROCHLORIDE 5 MG: 5 TABLET ORAL at 10:25

## 2024-08-29 RX ADMIN — KETOROLAC TROMETHAMINE 15 MG: 15 INJECTION, SOLUTION INTRAMUSCULAR; INTRAVENOUS at 04:14

## 2024-08-29 RX ADMIN — ACETAMINOPHEN 1000 MG: 500 TABLET ORAL at 01:33

## 2024-08-29 RX ADMIN — ENOXAPARIN SODIUM 40 MG: 100 INJECTION SUBCUTANEOUS at 21:35

## 2024-08-29 RX ADMIN — IBUPROFEN 800 MG: 800 TABLET, FILM COATED ORAL at 22:47

## 2024-08-29 RX ADMIN — PROMETHAZINE HYDROCHLORIDE 12.5 MG: 12.5 TABLET ORAL at 01:48

## 2024-08-29 NOTE — PLAN OF CARE
Goal Outcome Evaluation:      VSS, assessment WNL, banda catheter in place, ambulated x 2 w/ 2 person assist, pain controlled w/ medication, breast feeding

## 2024-08-29 NOTE — LACTATION NOTE
This note was copied from a baby's chart.  P4 term baby, 21hrs old. Mom reports baby is sleepy with breast feeding and hand pump given per request with instructions on pumping, syringe feeding and cleaning of pump parts.  Baby has had several wet and stool diapers.   Mom reports good milk supply, breast feeding her other children for one year each and has breast pump at home.  Reviewed milk production, how to know baby is getting enough milk and encouraged to call for any assistance.

## 2024-08-29 NOTE — PROGRESS NOTES
2024    Name:Miladis Downing    MR#:[id number]     Progress Note:  Post-Op day 1 S/P    HD:1    Subjective   34 y.o. yo Female  s/p CS at 39w0d doing well. Pain well controlled. Tolerating regular diet and having flatus. Lochia normal.     Patient Active Problem List   Diagnosis    High blood hemoglobin F    History of     History of  delivery, currently pregnant    Maternal anemia in pregnancy, antepartum    GBS (group B Streptococcus carrier), +RV culture, currently pregnant    H/O:         Objective    Vitals  Temp:  Temp:  [95.6 °F (35.3 °C)-98.5 °F (36.9 °C)] 97.7 °F (36.5 °C)  Temp src: Oral  BP:  BP: (101-139)/() 101/59  Pulse:  Heart Rate:  [64-83] 66  RR:   Resp:  [16-18] 16  Weight: 85.7 kg (189 lb)  BMI:  Body mass index is 35.71 kg/m².      General Awake, alert, no distress  Abdomen Soft, non-distended, fundus firm, 2 cm below umbilicus, appropriately tender  Incision  Intact, no erythema or exudate  Extremities Calves NT bilaterally         I/O last 3 completed shifts:  In:  [I.V.:]  Out:  [Urine:2000; Blood:131]    LABS:   Lab Results   Component Value Date    WBC 10.03 2024    HGB 8.6 (L) 2024    HCT 27.1 (L) 2024    MCV 84.4 2024     2024       Infant: male       Assessment   1.  POD 1    Plan: Doing well.  Routine postoperative care    Desires circumcision. Discussed is cosmetic. Risks of bleeding, damage to penis, infection and signs of infection, aftercare discussed. Desires to proceed.        History of     Maternal anemia in pregnancy, antepartum    GBS (group B Streptococcus carrier), +RV culture, currently pregnant    H/O:       Daisy Collins MD  2024 10:07 EDT

## 2024-08-30 PROCEDURE — 0503F POSTPARTUM CARE VISIT: CPT | Performed by: OBSTETRICS & GYNECOLOGY

## 2024-08-30 PROCEDURE — 25010000002 ENOXAPARIN PER 10 MG: Performed by: OBSTETRICS & GYNECOLOGY

## 2024-08-30 RX ADMIN — IBUPROFEN 800 MG: 800 TABLET, FILM COATED ORAL at 22:00

## 2024-08-30 RX ADMIN — OXYCODONE HYDROCHLORIDE 5 MG: 5 TABLET ORAL at 19:00

## 2024-08-30 RX ADMIN — IBUPROFEN 800 MG: 800 TABLET, FILM COATED ORAL at 13:52

## 2024-08-30 RX ADMIN — ACETAMINOPHEN 325MG 650 MG: 325 TABLET ORAL at 22:01

## 2024-08-30 RX ADMIN — ACETAMINOPHEN 325MG 650 MG: 325 TABLET ORAL at 08:51

## 2024-08-30 RX ADMIN — ENOXAPARIN SODIUM 40 MG: 100 INJECTION SUBCUTANEOUS at 22:01

## 2024-08-30 RX ADMIN — OXYCODONE HYDROCHLORIDE 5 MG: 5 TABLET ORAL at 05:15

## 2024-08-30 RX ADMIN — OXYCODONE HYDROCHLORIDE 5 MG: 5 TABLET ORAL at 09:17

## 2024-08-30 RX ADMIN — ACETAMINOPHEN 325MG 650 MG: 325 TABLET ORAL at 02:35

## 2024-08-30 RX ADMIN — OXYCODONE HYDROCHLORIDE 5 MG: 5 TABLET ORAL at 13:52

## 2024-08-30 RX ADMIN — ACETAMINOPHEN 325MG 650 MG: 325 TABLET ORAL at 15:46

## 2024-08-30 RX ADMIN — IBUPROFEN 800 MG: 800 TABLET, FILM COATED ORAL at 05:13

## 2024-08-30 NOTE — LACTATION NOTE
Lactation Consult Note  P4 term baby, 44hrs old. Baby is sleepy with feeding, nursing 10 minutes or less and Mom has been pumping and giving EBM.   Helped Mom latch using 24mm NS because baby was showing nipple confusion d/t getting pacifiers. Baby latched well bilaterally, but only stayed awake a few minutes each breast, no milk in shield upon release. Baby has had 2 wet and 2 stools so far today.   Discussed milk production, encouraged pumping every 2-3hrs, feeding baby all EBM after pumping. They also have formula and encouraged pace-feeding 10-15 mls of formula to assist in waking  baby so he could hopefully be more alert with breast feeding. Encouraged to call for any assistance today.    Evaluation Completed: 2024 09:52 EDT  Patient Name: Miladis Downing  :  1990  MRN:  [id number]     REFERRAL  INFORMATION:                          Date of Referral: 24   Person Making Referral: nurse  Maternal Reason for Referral: milk supply concern  Infant Reason for Referral: sleepy    DELIVERY HISTORY:        Skin to skin initiation date/time: 2024  1:23 PM   Skin to skin end date/time: 2024  1:36 PM        MATERNAL ASSESSMENT:  Breast Size Issue: none (24)  Breast Shape: Bilateral:, pendulous (24)  Breast Density: Bilateral:, soft (24)  Areola: Bilateral:, elastic (24)  Nipples: Bilateral:, everted, graspable (24)                INFANT ASSESSMENT:  Information for the patient's :  Rajani Downing [[id number]]   No past medical history on file.   Feeding Readiness Cues: quiet (24)      Feeding Tolerance/Success: arousal required (24)                              Breastfeeding: breastfeeding, bilateral (24)   Infant Positioning: cross-cradle, clutch/football (24)   Breastfeeding Time, Left (min): 2 (24)   Breastfeeding Time, Right (min): 2 (24)   Effective Latch During  Feeding: yes (24)   Suck/Swallow/Breathing Coordination: absent (24)   Signs of Milk Transfer: none noted (24)       Latch: 1-->repeated attempts, holds nipple in mouth, stimulate to suck (24)   Audible Swallowin-->none (24)   Type of Nipple: 2-->everted (after stimulation) (24)   Comfort (Breast/Nipple): 2-->soft/nontender (24)   Hold (Positioning): 1-->minimal assist, teach one side, mother does other, staff holds (24)   Latch Score: 6 (24)     Infant-Driven Feeding Scales - Readiness: Briefly alert with care. No hunger behaviors. No change in tone. (24)               MATERNAL INFANT FEEDING:     Maternal Emotional State: relaxed (24)  Infant Positioning: clutch/football, cross-cradle (24)   Signs of Milk Transfer: none noted (24)              Milk Ejection Reflex: present (24)           Latch Assistance: full assistance needed (24)                               EQUIPMENT TYPE:  Breast Pump Type: double electric, personal, manual pump, double electric, hospital grade (24)                              BREAST PUMPING:  Breast Pumping Interventions: frequent pumping encouraged, post-feed pumping encouraged (24)       LACTATION REFERRALS:

## 2024-08-30 NOTE — PROGRESS NOTES
[facility]   Obstetrics and Gynecology     2024    Name:Miladis Downing    MR#:[id number]     Progress Note:  Post-Op    HD:2    Subjective   34 y.o. yo Female  s/p CS at 39w0d doing well. Pain well controlled. Tolerating regular diet and having flatus. Lochia normal.   Infant having feeding issues. Desires   Patient Active Problem List   Diagnosis    High blood hemoglobin F    History of     History of  delivery, currently pregnant    Maternal anemia in pregnancy, antepartum    GBS (group B Streptococcus carrier), +RV culture, currently pregnant    H/O:         Objective    Vitals  Temp:  Temp:  [97.6 °F (36.4 °C)-98.2 °F (36.8 °C)] 98 °F (36.7 °C)  Temp src: Oral  BP:  BP: (112-121)/(52-74) 114/74  Pulse:  Heart Rate:  [71-81] 81  RR:   Resp:  [16-18] 16  Weight: 85.7 kg (189 lb)  BMI:  Body mass index is 35.71 kg/m².    Physical Exam  Vitals and nursing note reviewed.   Constitutional:       Appearance: Normal appearance. She is normal weight.   Pulmonary:      Effort: Pulmonary effort is normal.   Neurological:      Mental Status: She is alert and oriented to person, place, and time.   Psychiatric:         Mood and Affect: Mood normal.         Behavior: Behavior normal.         I/O last 3 completed shifts:  In: -   Out: 2550 [Urine:2550]    LABS:  Results from last 7 days   Lab Units 24  0728 24  1127   WBC 10*3/mm3 10.03 14.18*   HEMOGLOBIN g/dL 8.6* 11.1*   HEMATOCRIT % 27.1* 35.2   PLATELETS 10*3/mm3 185 171           Infant: male       Assessment    1.  POD#2     History of     Maternal anemia in pregnancy, antepartum    GBS (group B Streptococcus carrier), +RV culture, currently pregnant    H/O:       Plan:  Continue routine postoperative care   Desires discharge with infant     Fouzia Sotomayor MD  2024 10:33 EDT

## 2024-08-30 NOTE — PLAN OF CARE
Goal Outcome Evaluation:  Plan of Care Reviewed With: patient      AVSS, Assessment WDL. Pt getting up and doing routine care and voiding without difficulty. Pain controled with Tylenol, motrin and Roxycodone. Pt ambulates in halls well. Working on increasing babies feeding volumes by breastfeeding often, pumping and giving colostrum back and supplementing with formula.

## 2024-08-30 NOTE — PLAN OF CARE
Goal Outcome Evaluation:  Plan of Care Reviewed With: patient, spouse        Progress: improving   Pt doing well. VSS. Pain controlled with Ibuprofen, Tylenol, and PRN Oxycodone. Fundus and Lochia WNL. Steri-Stripes intact. Ambulating independently and voiding spontaneously. Breastfeeding okay, educated mother on supplementing with formula due to infant 9.4% weight loss. Mother is not totally against supplementing, but is still going to try breastfeeding along with using HGP every 2-3 hours.

## 2024-08-31 VITALS
HEIGHT: 61 IN | SYSTOLIC BLOOD PRESSURE: 121 MMHG | OXYGEN SATURATION: 97 % | BODY MASS INDEX: 35.68 KG/M2 | RESPIRATION RATE: 17 BRPM | DIASTOLIC BLOOD PRESSURE: 74 MMHG | HEART RATE: 89 BPM | TEMPERATURE: 98.4 F | WEIGHT: 189 LBS

## 2024-08-31 PROBLEM — Z98.891 S/P CESAREAN SECTION: Status: ACTIVE | Noted: 2024-08-31

## 2024-08-31 PROBLEM — Z98.891 H/O: C-SECTION: Status: RESOLVED | Noted: 2024-08-28 | Resolved: 2024-08-31

## 2024-08-31 PROCEDURE — 0503F POSTPARTUM CARE VISIT: CPT | Performed by: OBSTETRICS & GYNECOLOGY

## 2024-08-31 RX ORDER — IBUPROFEN 600 MG/1
600 TABLET, FILM COATED ORAL EVERY 6 HOURS PRN
Qty: 30 TABLET | Refills: 1 | Status: SHIPPED | OUTPATIENT
Start: 2024-08-31

## 2024-08-31 RX ORDER — HYDROCODONE BITARTRATE AND ACETAMINOPHEN 5; 325 MG/1; MG/1
1 TABLET ORAL EVERY 6 HOURS PRN
Qty: 14 TABLET | Refills: 0 | Status: SHIPPED | OUTPATIENT
Start: 2024-08-31

## 2024-08-31 RX ORDER — CEPHALEXIN 500 MG/1
500 CAPSULE ORAL 4 TIMES DAILY
Qty: 40 CAPSULE | Refills: 0 | Status: SHIPPED | OUTPATIENT
Start: 2024-08-31 | End: 2024-09-10

## 2024-08-31 RX ADMIN — IBUPROFEN 800 MG: 800 TABLET, FILM COATED ORAL at 06:13

## 2024-08-31 RX ADMIN — OXYCODONE HYDROCHLORIDE 5 MG: 5 TABLET ORAL at 06:15

## 2024-08-31 RX ADMIN — OXYCODONE HYDROCHLORIDE 5 MG: 5 TABLET ORAL at 01:42

## 2024-08-31 RX ADMIN — OXYCODONE HYDROCHLORIDE 5 MG: 5 TABLET ORAL at 10:15

## 2024-08-31 RX ADMIN — ACETAMINOPHEN 325MG 650 MG: 325 TABLET ORAL at 04:07

## 2024-08-31 RX ADMIN — ACETAMINOPHEN 325MG 650 MG: 325 TABLET ORAL at 10:15

## 2024-08-31 NOTE — NURSING NOTE
Noted patient non-immune to varicella. Discussed with Dr. Sohail Mendoza, who stated she did not need the vaccine since she was revaccinated at last pregnancy.

## 2024-08-31 NOTE — DISCHARGE SUMMARY
[facility]   Obstetrics and Gynecology    Section Discharge Summary    Date of Admission: 2024  Date of Discharge:  2024      Patient: Miladis Downing      MR#:[id number]    Surgeon/OB: Pantera Reynoso MD    Discharge Diagnosis:    section at 39w0d, uncomplicated recovery    History of     Maternal anemia in pregnancy, antepartum    GBS (group B Streptococcus carrier), +RV culture, currently pregnant    S/P  section        Procedures:  , Low Transverse     2024    1:13 PM      Anesthesia:  Spinal     Hospital Course  Patient is a 34 y.o. female  at 39w0d status post  section with uneventful postoperative recovery.  Patient was advanced to regular diet on postoperative day#1.  On discharge, ambulating, tolerating a regular diet without any difficulties and her incision is dry, clean and intact.     Infant:   male  fetus 3840 g (8 lb 7.5 oz)  with Apgar scores of 9 , 9  at five minutes.    Condition on Discharge:  Stable    Vital Signs  Temp:  [98.4 °F (36.9 °C)-98.8 °F (37.1 °C)] 98.4 °F (36.9 °C)  Heart Rate:  [77-89] 89  Resp:  [16-17] 17  BP: (121-127)/(64-74) 121/74    Results from last 7 days   Lab Units 24  0728 24  1127   WBC 10*3/mm3 10.03 14.18*   HEMOGLOBIN g/dL 8.6* 11.1*   HEMATOCRIT % 27.1* 35.2   PLATELETS 10*3/mm3 185 171             Discharge Disposition  Home or Self Care    Discharge Medications     Your medication list        START taking these medications        Instructions Last Dose Given Next Dose Due   cephalexin 500 MG capsule  Commonly known as: Keflex      Take 1 capsule by mouth 4 (Four) Times a Day for 10 days.       HYDROcodone-acetaminophen 5-325 MG per tablet  Commonly known as: Norco      Take 1 tablet by mouth Every 6 (Six) Hours As Needed for Moderate Pain or Severe Pain.       ibuprofen 600 MG tablet  Commonly known as: ADVIL,MOTRIN      Take 1 tablet by mouth Every 6 (Six) Hours As  Needed for Moderate Pain.              CONTINUE taking these medications        Instructions Last Dose Given Next Dose Due   calcium carbonate 500 MG chewable tablet  Commonly known as: TUMS      Chew 1 tablet Daily.       doxylamine 25 MG tablet  Commonly known as: UNISOM      Take 1 tablet by mouth.       famotidine 20 MG tablet  Commonly known as: PEPCID      Take 1 tablet by mouth.       ferrous sulfate 325 (65 FE) MG tablet      Take 1 tablet by mouth Daily With Breakfast.       prenatal (CLASSIC) vitamin 28-0.8 MG tablet tablet  Generic drug: prenatal vitamin      Take 1 tablet by mouth Daily.       vitamin B-6 50 MG tablet  Commonly known as: PYRIDOXINE      Take 1 tablet by mouth.                 Where to Get Your Medications        These medications were sent to [place] [id number]      Hours: Monday to Friday 7 AM to 6 PM, Saturday & Sunday 8 AM to 4:30 PM (Closed 12 PM to 12:30 PM) Phone: [phone]   cephalexin 500 MG capsule  HYDROcodone-acetaminophen 5-325 MG per tablet  ibuprofen 600 MG tablet           Discharge Diet: Regular    Follow-up Appointments  Future Appointments   Date Time Provider Department Center   9/11/2024  9:15 AM Pantera Reynoso MD [facility]  [facility]     Additional Instructions for the Follow-ups that You Need to Schedule       Call MD for problems / concerns.   As directed      Call for any problems with  1.  Heavy vaginal bleeding (greater than 2 pads an hours for 2 hours)  2.  Fever above 101.3  3.  Incisional redness  4.  Signs of Preeclampsia:  headache, visual changes or elevated blood pressure    Order Comments: Call for any problems with 1.  Heavy vaginal bleeding (greater than 2 pads an hours for 2 hours) 2.  Fever above 101.3 3.  Incisional redness 4.  Signs of Preeclampsia:  headache, visual changes or elevated blood pressure                 Prenatal labs/vax:   Immunization History   Administered Date(s) Administered     Fluzone (or Fluarix & Flulaval for VFC) >6mos 10/17/2017    HPV Quadrivalent 10/02/2014, 12/02/2014, 03/02/2015    PPD Test 01/19/2010    Tdap 03/26/2018, 08/04/2021, 06/19/2024    Varicella 10/22/2021       External Prenatal Results       Pregnancy Outside Results - Transcribed From Office Records - See Scanned Records For Details       Test Value Date Time    ABO  O  08/28/24 1127    Rh  Positive  08/28/24 1127    Antibody Screen  Negative  08/28/24 1127      ^ Negative  02/02/24 1100    Varicella IgG ^ 0.7 AI 02/02/24 1100    Rubella ^ Immune  02/02/24     Hgb  8.6 g/dL 08/29/24 0728       11.1 g/dL 08/28/24 1127       10.0 g/dL 06/19/24 0916      ^ 11.7 g/dL 02/02/24 1100    Hct  27.1 % 08/29/24 0728       35.2 % 08/28/24 1127      ^ 36.4 % 02/02/24 1100    HgB A1c        1h GTT  84 mg/dL 06/19/24 0916    3h GTT Fasting       3h GTT 1 hour       3h GTT 2 hour       3h GTT 3 hour        Gonorrhea (discrete)       Chlamydia (discrete)       RPR       Syphils cascade: TP-Ab (FTA)  Non-Reactive  08/28/24 1127    TP-Ab ^ <0.2 AI 02/02/24 1100    TP-Ab (EIA)       TPPA       HBsAg ^ Nonreactive  02/02/24 1100    Herpes Simplex Virus PCR       Herpes Simplex VIrus Culture       HIV ^ Nonreactive  02/02/24 1100    Hep C RNA Quant PCR       Hep C Antibody ^ Nonreactive  02/02/24 1100    AFP       NIPT       Cystic Fibroisis        Group B Strep  Positive  08/09/24     GBS Susceptibility to Clindamycin       GBS Susceptibility to Erythromycin       Fetal Fibronectin       Genetic Testing, Maternal Blood                 Drug Screening       Test Value Date Time    Urine Drug Screen       Amphetamine Screen ^ Negative  02/09/24 1048    Barbiturate Screen ^ Negative  02/09/24 1048    Benzodiazepine Screen ^ Negative  02/09/24 1048    Methadone Screen ^ Negative  02/09/24 1048    Phencyclidine Screen ^ Negative  02/09/24 1048    Opiates Screen ^ Negative  02/09/24 1048    THC Screen       Cocaine Screen       Propoxyphene  Screen       Buprenorphine Screen       Methamphetamine Screen ^ Negative  02/09/24 1048    Oxycodone Screen ^ Negative  02/09/24 1048    Tricyclic Antidepressants Screen                 Legend    ^: Historical                              Sohail Mendoza MD  8/31/2024  16:05 EDT

## 2024-08-31 NOTE — PLAN OF CARE
Goal Outcome Evaluation:                        Goals met adequately for discharge.

## 2024-08-31 NOTE — PROGRESS NOTES
[facility]   Obstetrics and Gynecology     2024    Name:Miladis Downing    MR#:[id number]     Progress Note:  Post-Op    HD:3    Subjective   34 y.o. yo Female  s/p CS at 39w0d doing well. Pain well controlled. Tolerating regular diet and having flatus. Lochia normal.     Patient Active Problem List   Diagnosis    High blood hemoglobin F    History of     History of  delivery, currently pregnant    Maternal anemia in pregnancy, antepartum    GBS (group B Streptococcus carrier), +RV culture, currently pregnant    H/O:         Objective    Vitals  Temp:  Temp:  [97.9 °F (36.6 °C)-98.8 °F (37.1 °C)] 98.4 °F (36.9 °C)  Temp src: Oral  BP:  BP: (121-129)/(64-78) 121/74  Pulse:  Heart Rate:  [75-89] 89  RR:   Resp:  [16-17] 17  Weight: 85.7 kg (189 lb)  BMI:  Body mass index is 35.71 kg/m².    Physical Exam  Vitals and nursing note reviewed.   Constitutional:       General: She is not in acute distress.     Appearance: Normal appearance. She is well-developed. She is not ill-appearing.   HENT:      Head: Normocephalic.   Pulmonary:      Effort: Pulmonary effort is normal.   Abdominal:      General: Abdomen is flat. There is no distension.      Palpations: Abdomen is soft. There is no mass.      Tenderness: There is no abdominal tenderness.   Genitourinary:     Vagina: Normal.      Comments: Lochia is scant  Fundus is firm    Incision:  dry/clean/intact  Musculoskeletal:         General: No swelling or tenderness.   Neurological:      Mental Status: She is alert and oriented to person, place, and time.   Psychiatric:         Behavior: Behavior normal.         Thought Content: Thought content normal.         Judgment: Judgment normal.         I/O last 3 completed shifts:  In: 960 [P.O.:960]  Out: 100 [Urine:100]    LABS:  Results from last 7 days   Lab Units 24  0728 24  1127   WBC 10*3/mm3 10.03 14.18*   HEMOGLOBIN g/dL 8.6* 11.1*   HEMATOCRIT %  27.1* 35.2   PLATELETS 10*3/mm3 185 171           Infant: male       Assessment    1.  POD#3     History of     Maternal anemia in pregnancy, antepartum    GBS (group B Streptococcus carrier), +RV culture, currently pregnant    H/O:     Requesting Rx for mastitis.  Discussed with Link previously     Plan:  Discharge today     Sohail Mendoza MD  2024 09:01 EDT

## 2024-09-11 ENCOUNTER — POSTPARTUM VISIT (OUTPATIENT)
Dept: OBSTETRICS AND GYNECOLOGY | Age: 34
End: 2024-09-11
Payer: COMMERCIAL

## 2024-09-11 VITALS
SYSTOLIC BLOOD PRESSURE: 124 MMHG | HEIGHT: 61 IN | BODY MASS INDEX: 31.91 KG/M2 | WEIGHT: 169 LBS | DIASTOLIC BLOOD PRESSURE: 68 MMHG

## 2024-09-11 DIAGNOSIS — Z98.890 POST-OPERATIVE STATE: Primary | ICD-10-CM

## 2024-09-11 NOTE — PROGRESS NOTES
Subjective       History of Present Illness  Miladis Downing is a 34 y.o. female is being seen today for incision check  Miladis delivered her little boy Ronni on  repeat  she did well we gave her a new scar after a revision and excision of her old keloid scar.  She reports overall she is doing well energy is coming back bleeding has stopped and pediatricians have been happy with the development of her son.  Chief Complaint   Patient presents with    Post-op Follow-up     2 wk postpartum  incision check:Del.24,breastfeeding,peter,male,8lbs.,7oz.   .        The following portions of the patient's history were reviewed and updated as appropriate: allergies, current medications, past family history, past medical history, past social history, past surgical history and problem list.    PAST MEDICAL HISTORY  Past Medical History:   Diagnosis Date    Ectopic pregnancy      OB History    Para Term  AB Living   5 4 4 0 1 4   SAB IAB Ectopic Molar Multiple Live Births   0 0 1 0   4      # Outcome Date GA Lbr Terry/2nd Weight Sex Type Anes PTL Lv   5 Term 24 39w0d  3840 g (8 lb 7.5 oz) M CS-LTranv Spinal N MONICA      Birth Comments: panda or 1   4 Term 10/19/21 39w3d  3700 g (8 lb 2.5 oz) M CS-LTranv Spinal N MONICA   3 Term 19 37w2d  3050 g (6 lb 11.6 oz) M CS-LTranv Spinal N MONICA      Complications: Ruptured, membranes, premature   2 Term 18 40w3d  3755 g (8 lb 4.5 oz) F CS-LTranv EPI N MONICA      Complications: Failure to Progress in First Stage, Failure to Progress in Second Stage   1 Ectopic 17              Birth Comments: Treated with mtx, no surgery     Past Surgical History:   Procedure Laterality Date     SECTION       SECTION       SECTION       SECTION N/A 2024    Procedure:  SECTION REPEAT;  Surgeon: Pantera Reynoso MD;  [facility] LABOR DELIVERY;  Service: Obstetrics/Gynecology;  Laterality: N/A;     No family  history on file.  Social History     Tobacco Use   Smoking Status Never    Passive exposure: Never   Smokeless Tobacco Never       Current Outpatient Medications:     ferrous sulfate 325 (65 FE) MG tablet, Take 1 tablet by mouth Daily With Breakfast., Disp: 30 tablet, Rfl: 11    prenatal vitamin (prenatal, CLASSIC, vitamin) tablet, Take 1 tablet by mouth Daily., Disp: , Rfl:     calcium carbonate (TUMS) 500 MG chewable tablet, Chew 1 tablet Daily. (Patient not taking: Reported on 9/11/2024), Disp: , Rfl:     doxylamine (UNISOM) 25 MG tablet, Take 1 tablet by mouth. (Patient not taking: Reported on 9/11/2024), Disp: , Rfl:     famotidine (PEPCID) 20 MG tablet, Take 1 tablet by mouth. (Patient not taking: Reported on 9/11/2024), Disp: , Rfl:     HYDROcodone-acetaminophen (Norco) 5-325 MG per tablet, Take 1 tablet by mouth Every 6 (Six) Hours As Needed for Moderate Pain or Severe Pain. (Patient not taking: Reported on 9/11/2024), Disp: 14 tablet, Rfl: 0    ibuprofen (ADVIL,MOTRIN) 600 MG tablet, Take 1 tablet by mouth Every 6 (Six) Hours As Needed for Moderate Pain. (Patient not taking: Reported on 9/11/2024), Disp: 30 tablet, Rfl: 1    vitamin B-6 (PYRIDOXINE) 50 MG tablet, Take 1 tablet by mouth. (Patient not taking: Reported on 9/11/2024), Disp: , Rfl:   Immunization History   Administered Date(s) Administered    Fluzone (or Fluarix & Flulaval for VFC) >6mos 10/17/2017    HPV Quadrivalent 10/02/2014, 12/02/2014, 03/02/2015    PPD Test 01/19/2010    Tdap 03/26/2018, 08/04/2021, 06/19/2024    Varicella 10/22/2021       Review of Systems       Except as outlined in history of physical illness, patient denies any changes in her GYN, , GI systems. All other systems reviewed are negative.    Objective   Physical Exam   Alert and oriented, respirations unlabored, heart regular rate and rhythm   Pelvic deferred  Incision clean dry intact healing nicely small suture tag on the left is visible      Assessment & Plan    Diagnoses and all orders for this visit:    1. Post-operative state (Primary)    Very reassuring postoperative clinical examination today patient and her son Ronni seem to be doing very well  Slow return to physical activities maintain pelvic rest call us with any problems questions or concerns otherwise return in 1 month for postpartum visit             No orders of the defined types were placed in this encounter.          EMR Dragon/ Transcription disclaimer:  Much of the encounter note is an electronic transcription/translation of spoken language to printed text. The electronic translation of spoken language may permit erroneous, or at times, nonessential words or phrases to be inadvertently transcribes; Although i have reviewed the note for such errors, some may still exist.

## 2024-09-18 ENCOUNTER — TELEPHONE (OUTPATIENT)
Dept: OBSTETRICS AND GYNECOLOGY | Age: 34
End: 2024-09-18
Payer: COMMERCIAL

## 2024-09-19 ENCOUNTER — POSTPARTUM VISIT (OUTPATIENT)
Dept: OBSTETRICS AND GYNECOLOGY | Age: 34
End: 2024-09-19
Payer: COMMERCIAL

## 2024-09-19 VITALS
HEIGHT: 61 IN | WEIGHT: 167 LBS | BODY MASS INDEX: 31.53 KG/M2 | SYSTOLIC BLOOD PRESSURE: 112 MMHG | DIASTOLIC BLOOD PRESSURE: 70 MMHG

## 2024-09-19 DIAGNOSIS — T81.49XA INFECTED INCISION: ICD-10-CM

## 2024-09-19 RX ORDER — CLINDAMYCIN HCL 300 MG
300 CAPSULE ORAL 3 TIMES DAILY
Qty: 21 CAPSULE | Refills: 0 | Status: SHIPPED | OUTPATIENT
Start: 2024-09-19 | End: 2024-09-26

## 2024-10-11 ENCOUNTER — POSTPARTUM VISIT (OUTPATIENT)
Dept: OBSTETRICS AND GYNECOLOGY | Age: 34
End: 2024-10-11
Payer: COMMERCIAL

## 2024-10-11 VITALS
HEIGHT: 61 IN | BODY MASS INDEX: 31.34 KG/M2 | WEIGHT: 166 LBS | DIASTOLIC BLOOD PRESSURE: 70 MMHG | SYSTOLIC BLOOD PRESSURE: 112 MMHG

## 2024-10-11 NOTE — PROGRESS NOTES
Patient presents for a postpartum visit. She is 6 weeks postpartum following a low cervical transverse  section. I have fully reviewed the prenatal and intrapartum course. The delivery was at 39 gestational weeks. Outcome: repeat  section, low transverse incision. Anesthesia: spinal. Postpartum course has been normal. Baby's course has been normal. Baby is feeding by breast. Bleeding no bleeding. Bowel function is normal. Bladder function is normal. Patient is not sexually active. Contraception method is abstinence. Postpartum depression screening: negative.         The following portions of the patient's history were reviewed and updated as appropriate: allergies, current medications, past family history, past medical history, past social history, past surgical history, and problem list.    Review of Systems  Pertinent items are noted in HPI.        Vitals:    10/11/24 1020   BP: 112/70       General:  alert, appears stated age, and cooperative    Breasts:  Not performed   Lungs: Not performed   Heart:  Not performed   Abdomen: Incision C/D/I    Vulva:  not evaluated   Vagina: not evaluated   Cervix:  Not evaluated   Corpus: not examined   Adnexa:  not evaluated   Rectal Exam: Not performed.         Normal postpartum exam. Pap smear not done at today's visit. She had a normal pap at previous OB office prior to transferring. Results was reviewed.    1. Contraception: none  2. Declines birth control. Incision is C/D/I, no s/s infection.  3. Follow up in: 1  year  or as needed.

## (undated) DEVICE — MEDIUM VISCERA RETAINER: Brand: VISCERA RETAINER, FISH

## (undated) DEVICE — ANTIBACTERIAL UNDYED BRAIDED (POLYGLACTIN 910), SYNTHETIC ABSORBABLE SUTURE: Brand: COATED VICRYL

## (undated) DEVICE — GLV SURG BIOGEL LTX PF 8

## (undated) DEVICE — SLV SCD CALF HEMOFORCE DVT THERP REPROC MD

## (undated) DEVICE — SUT MNCRYL 0/0 CTX 36IN Y398H

## (undated) DEVICE — SOL IRR H2O BTL 1000ML STRL